# Patient Record
Sex: MALE | Race: WHITE
[De-identification: names, ages, dates, MRNs, and addresses within clinical notes are randomized per-mention and may not be internally consistent; named-entity substitution may affect disease eponyms.]

---

## 2019-01-04 ENCOUNTER — HOSPITAL ENCOUNTER (EMERGENCY)
Dept: HOSPITAL 80 - FED | Age: 24
Discharge: HOME | End: 2019-01-04
Payer: COMMERCIAL

## 2019-01-04 VITALS — DIASTOLIC BLOOD PRESSURE: 78 MMHG | SYSTOLIC BLOOD PRESSURE: 133 MMHG

## 2019-01-04 DIAGNOSIS — F17.200: ICD-10-CM

## 2019-01-04 DIAGNOSIS — N13.2: Primary | ICD-10-CM

## 2019-01-04 DIAGNOSIS — Z88.2: ICD-10-CM

## 2019-01-04 DIAGNOSIS — N13.4: ICD-10-CM

## 2019-01-04 LAB — PLATELET # BLD: 230 10^3/UL (ref 150–400)

## 2019-01-04 NOTE — EDPHY
H & P


Stated Complaint: l flank pain


Time Seen by Provider: 01/04/19 10:01


HPI/ROS: 





CHIEF COMPLAINT:  Left flank pain since this morning





HISTORY OF PRESENT ILLNESS:  23-year-old male generally healthy, no history of 

nephrolithiasis, via private vehicle complaining of acute sharp stabbing left 

flank pain seems to come in waves..  Radiates to his abdomen.  No testicle 

pain.  Nausea or vomiting.  No trauma.  No rash no lesions.  No chest pain.  No 

fall.  No history of similar.








REVIEW OF SYSTEMS:


10 systems reviewed and negative with the exception of the elements mentioned 

in the history of present illness








PAST MEDICAL & SURGICAL  HISTORY:  No history of nephrolithiasis





SOCIAL HISTORY:  No alcohol use.














************


PHYSICAL EXAM





(Prior to examination, patient consented to physical exam, hands were washed 

and my usual and customary physical exam procedures followed)


1) GENERAL: Well-developed, well-nourished, alert and oriented.  Appears 

uncomfortable


2) HEAD: Normocephalic, atraumatic


3) HEENT: Pupils equal, round, reactive to light bilaterally.  Sclera 

anicteric.  Nasopharynx, oropharynx, clear, no lesions. Moist Mucous membranes. 

Ears bilaterally with normal tympanic membranes.


4) NECK: Full range of motion, no meningeal signs.


5) LUNGS: Clear auscultation bilaterally, no wheezes, no rhonchi, no 

retractions.   


6) HEART: Regular rate and rhythm, no murmur, no heave, no gallop.


7) ABDOMEN: No guarding, no rebound, no focal tenderness, negative McBurney's, 

negative Alexander's, negative Rovsing's, negative peritoneal sign,


8) MUSCULOSKELETAL: Moving all extremities, no focal areas of tenderness, no 

obvious trauma.  No peripheral edema or discoloration.


9) BACK: No CVA tenderness, no midline vertebral tenderness, no fluctuance, no 

step-off, no obvious trauma, no visual or palpable abnormality. 


10) SKIN: No rash, no petechiae. 


11) :  Normal male external genitalia bilateral testicles nontender bilateral 

cremasteric reflex present and brisk, no high-riding testicle








***************





DIFFERENTIAL DIAGNOSIS:   In no particular order including but not limited to 

nephrolithiasis, pyelonephritis, diverticulitis








- Personal History


Current Tetanus Diphtheria and Acellular Pertussis (TDAP): Unsure





- Medical/Surgical History


Hx Asthma: No


Hx Chronic Respiratory Disease: No


Hx Diabetes: No


Hx Cardiac Disease: No


Hx Renal Disease: No


Hx Cirrhosis: No


Hx Alcoholism: No


Hx HIV/AIDS: No


Hx Splenectomy or Spleen Trauma: No


Other PMH: opiate abuse





- Social History


Smoking Status: Current every day smoker


Constitutional: 


 Initial Vital Signs











Temperature (C)  36.4 C   01/04/19 09:48


 


Heart Rate  66   01/04/19 09:48


 


Respiratory Rate  18   01/04/19 09:48


 


Blood Pressure  139/92 H  01/04/19 09:48


 


O2 Sat (%)  100   01/04/19 09:48








 











O2 Delivery Mode               Room Air














Allergies/Adverse Reactions: 


 





Sulfa (Sulfonamide Antibiotics) Allergy (Verified 01/04/19 09:47)


 








Home Medications: 














 Medication  Instructions  Recorded


 


Cephalexin [Keflex] 500 mg PO TID 7 Days  cap 01/04/19


 


Tamsulosin HCl [Flomax] 0.4 mg PO DAILY #7 cap 01/04/19


 


Vivitrol  01/04/19














Medical Decision Making





- Diagnostics


Imaging Results: 


 Imaging Impressions





Abdomen/Pelvis CT  01/04/19 10:05


Impression:  5.2-mm calculus in the distal left ureter at the ureterovesical 

junction with moderate hydroureter and hydronephrosis. Nonobstructive bilateral 

nephrolithiasis. 


 


Results called and discussed with Kd De La Torre PA-C on January 4, 2019 at 1055 

hours.


 


Attention:  This CT examination is specifically designed to evaluate patients 

who are clinically suspected of having acute obstructive uropathy.  This 

examination does not use radiographic contrast, and as such, provides only a  

limited evaluation of the abdomen, pelvis and retroperitoneum.  If there is 

further clinical suspicion for pathological conditions other than obstructive 

uropathy, a complete CT evaluation of the abdomen and pelvis utilizing 

intravenous, oral, and rectal contrast should be considered.


 


 











ED Course/Re-evaluation: 











10:55 a.m.:  Re-evaluation after IV lidocaine, Toradol.  Total relief of pain 

at this time.  Discussed with him his imaging showing 5 mm stone at the left 

UVJ.  I have offered further analgesia which he declines noting a substance 

abuse history.  Plan will be discharged with Flomax, Keflex as he is noted to 

have small amount of pyuria bacteriuria.  Urine has been cultured.  Recommend 

follow up with Urology.  Given urine strainer.  Given my usual and customary 

urologic precautions and instructions.  Care of patient under supervision of  

secondary supervising physician Dr Galdamez with whom I discussed case.





- Data Points


Laboratory Results: 


 Laboratory Results





 01/04/19 10:17 





 01/04/19 10:17 





 











  01/04/19 01/04/19 01/04/19





  10:17 10:17 09:50


 


WBC    11.34 10^3/uL H 10^3/uL  





    (3.80-9.50)  


 


RBC    5.20 10^6/uL 10^6/uL  





    (4.40-6.38)  


 


Hgb    15.8 g/dL g/dL  





    (13.7-17.5)  


 


Hct    46.0 % %  





    (40.0-51.0)  


 


MCV    88.5 fL fL  





    (81.5-99.8)  


 


MCH    30.4 pg pg  





    (27.9-34.1)  


 


MCHC    34.3 g/dL g/dL  





    (32.4-36.7)  


 


RDW    11.4 % L %  





    (11.5-15.2)  


 


Plt Count    230 10^3/uL 10^3/uL  





    (150-400)  


 


MPV    9.5 fL fL  





    (8.7-11.7)  


 


Neut % (Auto)    76.0 % H %  





    (39.3-74.2)  


 


Lymph % (Auto)    12.5 % L %  





    (15.0-45.0)  


 


Mono % (Auto)    8.5 % %  





    (4.5-13.0)  


 


Eos % (Auto)    1.7 % %  





    (0.6-7.6)  


 


Baso % (Auto)    0.9 % %  





    (0.3-1.7)  


 


Nucleat RBC Rel Count    0.0 % %  





    (0.0-0.2)  


 


Absolute Neuts (auto)    8.62 10^3/uL H 10^3/uL  





    (1.70-6.50)  


 


Absolute Lymphs (auto)    1.42 10^3/uL 10^3/uL  





    (1.00-3.00)  


 


Absolute Monos (auto)    0.96 10^3/uL H 10^3/uL  





    (0.30-0.80)  


 


Absolute Eos (auto)    0.19 10^3/uL 10^3/uL  





    (0.03-0.40)  


 


Absolute Basos (auto)    0.10 10^3/uL 10^3/uL  





    (0.02-0.10)  


 


Absolute Nucleated RBC    0.00 10^3/uL 10^3/uL  





    (0-0.01)  


 


Immature Gran %    0.4 % %  





    (0.0-1.1)  


 


Immature Gran #    0.05 10^3/uL 10^3/uL  





    (0.00-0.10)  


 


Sodium  137 mEq/L mEq/L    





   (135-145)   


 


Potassium  3.8 mEq/L mEq/L    





   (3.5-5.2)   


 


Chloride  108 mEq/L mEq/L    





   ()   


 


Carbon Dioxide  20 mEq/l L mEq/l    





   (22-31)   


 


Anion Gap  9 mEq/L mEq/L    





   (6-14)   


 


BUN  18 mg/dL mg/dL    





   (7-23)   


 


Creatinine  1.1 mg/dL mg/dL    





   (0.7-1.3)   


 


Estimated GFR  > 60     





    


 


Glucose  109 mg/dL H mg/dL    





   ()   


 


Calcium  9.7 mg/dL mg/dL    





   (8.5-10.4)   


 


Urine Color      YELLOW 





    


 


Urine Appearance      HAZY 





    


 


Urine pH      6.0 





     (5.0-7.5) 


 


Ur Specific Gravity      1.025 





     (1.002-1.030) 


 


Urine Protein      1+  H 





     (NEGATIVE) 


 


Urine Ketones      NEGATIVE 





     (NEGATIVE) 


 


Urine Blood      3+  H 





     (NEGATIVE) 


 


Urine Nitrate      NEGATIVE 





     (NEGATIVE) 


 


Urine Bilirubin      NEGATIVE 





     (NEGATIVE) 


 


Urine Urobilinogen      2.0 EU H EU





     (0.2-1.0) 


 


Ur Leukocyte Esterase      NEGATIVE 





     (NEGATIVE) 


 


Urine RBC       /hpf H /hpf





     (0-3) 


 


Urine WBC      15-25 /hpf H /hpf





     (0-3) 


 


Ur Epithelial Cells      TRACE /lpf /lpf





     (NONE-1+) 


 


Urine Bacteria      TRACE /hpf H /hpf





     (NONE SEEN) 


 


Urine Mucus      4+ /lpf H /lpf





     (NONE-1+) 


 


Urine Glucose      NEGATIVE 





     (NEGATIVE) 











Medications Given: 


 








Discontinued Medications





Cephalexin HCl (Keflex)  500 mg PO EDNOW ONE


   PRN Reason: Protocol


   Stop: 01/04/19 10:49


   Last Admin: 01/04/19 11:16 Dose:  500 mg


Lidocaine HCl 100 mg/ Sodium (Chloride)  110 mls @ 600 mls/hr IV EDNOW ONE


   Stop: 01/04/19 10:13


   Last Admin: 01/04/19 10:27 Dose:  110 mls


Sodium Chloride (Ns)  1,000 mls @ 3,000 mls/hr IV EDNOW ONE


   Stop: 01/04/19 10:22


   Last Admin: 01/04/19 10:33 Dose:  1,000 mls


Tamsulosin HCl (Flomax)  0.4 mg PO EDNOW ONE


   Stop: 01/04/19 10:37


   Last Admin: 01/04/19 11:15 Dose:  0.4 mg








Departure





- Departure


Disposition: Home, Routine, Self-Care


Clinical Impression: 


 Nephrolithiasis





Condition: Good


Instructions:  Kidney Stones (ED)


Additional Instructions: 


Return to the ER if you develop fever, chills, nausea, vomiting, or any other 

symptoms that concern you.


Referrals: 


Marshall Childress MD [Medical Doctor] - 2-3 days without fail


Prescriptions: 


Cephalexin [Keflex] 500 mg PO TID 7 Days  cap


Tamsulosin HCl [Flomax] 0.4 mg PO DAILY #7 cap